# Patient Record
Sex: MALE
[De-identification: names, ages, dates, MRNs, and addresses within clinical notes are randomized per-mention and may not be internally consistent; named-entity substitution may affect disease eponyms.]

---

## 2019-01-17 ENCOUNTER — HOSPITAL ENCOUNTER (EMERGENCY)
Dept: HOSPITAL 88 - ER | Age: 19
Discharge: HOME | End: 2019-01-17
Payer: SELF-PAY

## 2019-01-17 VITALS — WEIGHT: 250 LBS | BODY MASS INDEX: 33.86 KG/M2 | HEIGHT: 72 IN

## 2019-01-17 VITALS — SYSTOLIC BLOOD PRESSURE: 164 MMHG | DIASTOLIC BLOOD PRESSURE: 90 MMHG
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
## 2019-01-17 DIAGNOSIS — S93.492A: Primary | ICD-10-CM

## 2019-01-17 PROCEDURE — 99284 EMERGENCY DEPT VISIT MOD MDM: CPT

## 2019-01-17 NOTE — DIAGNOSTIC IMAGING REPORT
Exam:  Left foot and ankle 3 views



History:  Pain, evaluate for fracture



Comparison: None.



Findings: No fracture or malalignment. Joint spaces preserved. Soft tissue

swelling



Impression:



No acute osseous abnormality



Soft tissue swelling.



Signed by: Dr. Andrew Palisch, M.D. on 1/17/2019 4:55 PM